# Patient Record
Sex: FEMALE | Race: OTHER | NOT HISPANIC OR LATINO
[De-identification: names, ages, dates, MRNs, and addresses within clinical notes are randomized per-mention and may not be internally consistent; named-entity substitution may affect disease eponyms.]

---

## 2019-03-15 ENCOUNTER — ASOB RESULT (OUTPATIENT)
Age: 34
End: 2019-03-15

## 2019-03-15 ENCOUNTER — APPOINTMENT (OUTPATIENT)
Dept: ANTEPARTUM | Facility: CLINIC | Age: 34
End: 2019-03-15
Payer: MEDICARE

## 2019-03-15 PROBLEM — Z00.00 ENCOUNTER FOR PREVENTIVE HEALTH EXAMINATION: Status: ACTIVE | Noted: 2019-03-15

## 2019-03-15 PROCEDURE — 76801 OB US < 14 WKS SINGLE FETUS: CPT

## 2019-03-15 PROCEDURE — 76813 OB US NUCHAL MEAS 1 GEST: CPT

## 2019-03-15 PROCEDURE — 36416 COLLJ CAPILLARY BLOOD SPEC: CPT

## 2019-05-04 ENCOUNTER — APPOINTMENT (OUTPATIENT)
Dept: ANTEPARTUM | Facility: CLINIC | Age: 34
End: 2019-05-04
Payer: MEDICARE

## 2019-05-04 ENCOUNTER — ASOB RESULT (OUTPATIENT)
Age: 34
End: 2019-05-04

## 2019-05-04 PROCEDURE — 76805 OB US >/= 14 WKS SNGL FETUS: CPT

## 2019-09-22 ENCOUNTER — INPATIENT (INPATIENT)
Facility: HOSPITAL | Age: 34
LOS: 2 days | Discharge: HOME | End: 2019-09-25
Attending: OBSTETRICS & GYNECOLOGY | Admitting: OBSTETRICS & GYNECOLOGY

## 2019-09-22 VITALS
SYSTOLIC BLOOD PRESSURE: 136 MMHG | TEMPERATURE: 99 F | DIASTOLIC BLOOD PRESSURE: 81 MMHG | HEART RATE: 79 BPM | RESPIRATION RATE: 18 BRPM

## 2019-09-22 DIAGNOSIS — O26.899 OTHER SPECIFIED PREGNANCY RELATED CONDITIONS, UNSPECIFIED TRIMESTER: ICD-10-CM

## 2019-09-22 DIAGNOSIS — Z3A.00 WEEKS OF GESTATION OF PREGNANCY NOT SPECIFIED: ICD-10-CM

## 2019-09-23 ENCOUNTER — TRANSCRIPTION ENCOUNTER (OUTPATIENT)
Age: 34
End: 2019-09-23

## 2019-09-23 LAB
AMNISURE ROM (RUPTURE OF MEMBRANES): NEGATIVE — SIGNIFICANT CHANGE UP
BASOPHILS # BLD AUTO: 0.03 K/UL — SIGNIFICANT CHANGE UP (ref 0–0.2)
BASOPHILS NFR BLD AUTO: 0.3 % — SIGNIFICANT CHANGE UP (ref 0–2)
BLD GP AB SCN SERPL QL: NEGATIVE — SIGNIFICANT CHANGE UP
EOSINOPHIL # BLD AUTO: 0.17 K/UL — SIGNIFICANT CHANGE UP (ref 0–0.5)
EOSINOPHIL NFR BLD AUTO: 1.9 % — SIGNIFICANT CHANGE UP (ref 0–6)
HCT VFR BLD CALC: 36.6 % — SIGNIFICANT CHANGE UP (ref 34.5–45)
HGB BLD-MCNC: 11.6 G/DL — SIGNIFICANT CHANGE UP (ref 11.5–15.5)
IMM GRANULOCYTES NFR BLD AUTO: 0.9 % — SIGNIFICANT CHANGE UP (ref 0–1.5)
LYMPHOCYTES # BLD AUTO: 2.73 K/UL — SIGNIFICANT CHANGE UP (ref 1–3.3)
LYMPHOCYTES # BLD AUTO: 30.8 % — SIGNIFICANT CHANGE UP (ref 13–44)
MCHC RBC-ENTMCNC: 30.9 PG — SIGNIFICANT CHANGE UP (ref 27–34)
MCHC RBC-ENTMCNC: 31.7 % — LOW (ref 32–36)
MCV RBC AUTO: 97.6 FL — SIGNIFICANT CHANGE UP (ref 80–100)
MONOCYTES # BLD AUTO: 0.67 K/UL — SIGNIFICANT CHANGE UP (ref 0–0.9)
MONOCYTES NFR BLD AUTO: 7.6 % — SIGNIFICANT CHANGE UP (ref 2–14)
NEUTROPHILS # BLD AUTO: 5.19 K/UL — SIGNIFICANT CHANGE UP (ref 1.8–7.4)
NEUTROPHILS NFR BLD AUTO: 58.5 % — SIGNIFICANT CHANGE UP (ref 43–77)
NRBC # FLD: 0 K/UL — SIGNIFICANT CHANGE UP (ref 0–0)
PLATELET # BLD AUTO: 216 K/UL — SIGNIFICANT CHANGE UP (ref 150–400)
PMV BLD: 11 FL — SIGNIFICANT CHANGE UP (ref 7–13)
RBC # BLD: 3.75 M/UL — LOW (ref 3.8–5.2)
RBC # FLD: 13.2 % — SIGNIFICANT CHANGE UP (ref 10.3–14.5)
RH IG SCN BLD-IMP: POSITIVE — SIGNIFICANT CHANGE UP
RH IG SCN BLD-IMP: POSITIVE — SIGNIFICANT CHANGE UP
T PALLIDUM AB TITR SER: NEGATIVE — SIGNIFICANT CHANGE UP
WBC # BLD: 8.87 K/UL — SIGNIFICANT CHANGE UP (ref 3.8–10.5)
WBC # FLD AUTO: 8.87 K/UL — SIGNIFICANT CHANGE UP (ref 3.8–10.5)

## 2019-09-23 RX ORDER — BENZOCAINE 10 %
1 GEL (GRAM) MUCOUS MEMBRANE EVERY 6 HOURS
Refills: 0 | Status: DISCONTINUED | OUTPATIENT
Start: 2019-09-23 | End: 2019-09-25

## 2019-09-23 RX ORDER — DIPHENHYDRAMINE HCL 50 MG
25 CAPSULE ORAL EVERY 6 HOURS
Refills: 0 | Status: DISCONTINUED | OUTPATIENT
Start: 2019-09-23 | End: 2019-09-25

## 2019-09-23 RX ORDER — IBUPROFEN 200 MG
600 TABLET ORAL EVERY 6 HOURS
Refills: 0 | Status: COMPLETED | OUTPATIENT
Start: 2019-09-23 | End: 2020-08-21

## 2019-09-23 RX ORDER — LANOLIN
1 OINTMENT (GRAM) TOPICAL EVERY 6 HOURS
Refills: 0 | Status: DISCONTINUED | OUTPATIENT
Start: 2019-09-23 | End: 2019-09-25

## 2019-09-23 RX ORDER — SIMETHICONE 80 MG/1
80 TABLET, CHEWABLE ORAL EVERY 4 HOURS
Refills: 0 | Status: DISCONTINUED | OUTPATIENT
Start: 2019-09-23 | End: 2019-09-25

## 2019-09-23 RX ORDER — ACETAMINOPHEN 500 MG
975 TABLET ORAL
Refills: 0 | Status: DISCONTINUED | OUTPATIENT
Start: 2019-09-24 | End: 2019-09-25

## 2019-09-23 RX ORDER — MAGNESIUM HYDROXIDE 400 MG/1
30 TABLET, CHEWABLE ORAL
Refills: 0 | Status: DISCONTINUED | OUTPATIENT
Start: 2019-09-23 | End: 2019-09-25

## 2019-09-23 RX ORDER — TETANUS TOXOID, REDUCED DIPHTHERIA TOXOID AND ACELLULAR PERTUSSIS VACCINE, ADSORBED 5; 2.5; 8; 8; 2.5 [IU]/.5ML; [IU]/.5ML; UG/.5ML; UG/.5ML; UG/.5ML
0.5 SUSPENSION INTRAMUSCULAR ONCE
Refills: 0 | Status: COMPLETED | OUTPATIENT
Start: 2019-09-23

## 2019-09-23 RX ORDER — AER TRAVELER 0.5 G/1
1 SOLUTION RECTAL; TOPICAL EVERY 4 HOURS
Refills: 0 | Status: DISCONTINUED | OUTPATIENT
Start: 2019-09-23 | End: 2019-09-25

## 2019-09-23 RX ORDER — GLYCERIN ADULT
1 SUPPOSITORY, RECTAL RECTAL AT BEDTIME
Refills: 0 | Status: DISCONTINUED | OUTPATIENT
Start: 2019-09-23 | End: 2019-09-25

## 2019-09-23 RX ORDER — PRAMOXINE HYDROCHLORIDE 150 MG/15G
1 AEROSOL, FOAM RECTAL EVERY 4 HOURS
Refills: 0 | Status: DISCONTINUED | OUTPATIENT
Start: 2019-09-23 | End: 2019-09-25

## 2019-09-23 RX ORDER — DIBUCAINE 1 %
1 OINTMENT (GRAM) RECTAL EVERY 6 HOURS
Refills: 0 | Status: DISCONTINUED | OUTPATIENT
Start: 2019-09-23 | End: 2019-09-25

## 2019-09-23 RX ORDER — OXYTOCIN 10 UNIT/ML
2 VIAL (ML) INJECTION
Qty: 30 | Refills: 0 | Status: DISCONTINUED | OUTPATIENT
Start: 2019-09-23 | End: 2019-09-23

## 2019-09-23 RX ORDER — SODIUM CHLORIDE 9 MG/ML
3 INJECTION INTRAMUSCULAR; INTRAVENOUS; SUBCUTANEOUS EVERY 8 HOURS
Refills: 0 | Status: DISCONTINUED | OUTPATIENT
Start: 2019-09-23 | End: 2019-09-25

## 2019-09-23 RX ORDER — OXYCODONE HYDROCHLORIDE 5 MG/1
5 TABLET ORAL
Refills: 0 | Status: DISCONTINUED | OUTPATIENT
Start: 2019-09-23 | End: 2019-09-25

## 2019-09-23 RX ORDER — CITRIC ACID/SODIUM CITRATE 300-500 MG
15 SOLUTION, ORAL ORAL EVERY 6 HOURS
Refills: 0 | Status: DISCONTINUED | OUTPATIENT
Start: 2019-09-23 | End: 2019-09-23

## 2019-09-23 RX ORDER — OXYCODONE HYDROCHLORIDE 5 MG/1
5 TABLET ORAL ONCE
Refills: 0 | Status: DISCONTINUED | OUTPATIENT
Start: 2019-09-23 | End: 2019-09-25

## 2019-09-23 RX ORDER — SODIUM CHLORIDE 9 MG/ML
1000 INJECTION, SOLUTION INTRAVENOUS
Refills: 0 | Status: DISCONTINUED | OUTPATIENT
Start: 2019-09-23 | End: 2019-09-23

## 2019-09-23 RX ORDER — HYDROCORTISONE 1 %
1 OINTMENT (GRAM) TOPICAL EVERY 6 HOURS
Refills: 0 | Status: DISCONTINUED | OUTPATIENT
Start: 2019-09-23 | End: 2019-09-25

## 2019-09-23 RX ORDER — KETOROLAC TROMETHAMINE 30 MG/ML
30 SYRINGE (ML) INJECTION ONCE
Refills: 0 | Status: DISCONTINUED | OUTPATIENT
Start: 2019-09-23 | End: 2019-09-23

## 2019-09-23 RX ORDER — OXYTOCIN 10 UNIT/ML
333.33 VIAL (ML) INJECTION
Qty: 20 | Refills: 0 | Status: COMPLETED | OUTPATIENT
Start: 2019-09-23 | End: 2019-09-23

## 2019-09-23 RX ORDER — DOCUSATE SODIUM 100 MG
100 CAPSULE ORAL
Refills: 0 | Status: DISCONTINUED | OUTPATIENT
Start: 2019-09-23 | End: 2019-09-25

## 2019-09-23 RX ADMIN — Medication 1000 MILLIUNIT(S)/MIN: at 17:14

## 2019-09-23 RX ADMIN — Medication 30 MILLIGRAM(S): at 18:01

## 2019-09-23 RX ADMIN — SODIUM CHLORIDE 125 MILLILITER(S): 9 INJECTION, SOLUTION INTRAVENOUS at 03:52

## 2019-09-23 RX ADMIN — SODIUM CHLORIDE 3 MILLILITER(S): 9 INJECTION INTRAMUSCULAR; INTRAVENOUS; SUBCUTANEOUS at 22:18

## 2019-09-23 RX ADMIN — Medication 2 MILLIUNIT(S)/MIN: at 06:47

## 2019-09-23 RX ADMIN — Medication 30 MILLIGRAM(S): at 17:28

## 2019-09-23 NOTE — OB PROVIDER IHI INDUCTION/AUGMENTATION NOTE - NS_CHECKALL_OBGYN_ALL_OB
FHR was reviewed/H&P was completed/Order was written/Induction / Augmentation was discussed/Contractions pattern was reviewed

## 2019-09-23 NOTE — OB PROVIDER H&P - ASSESSMENT
Pt of Dr. Bruce: 34yo  at 40.4wks, presents to triage with c/o contractions and some leakage of fluid around 10am. Denies any vaginal bleeding; reports good fetal movement.    Allergies: denies  Meds: PNV  PMH: denies  PSH: denies  OBYN  -3/13/2006, , FT, 8#  -Current AP comp: denies    Assessment/ Plan    VS: /73, HR 75, T 37.2  Abd soft, nontender, gravid  SSE: mucoursy discharge, negative nitrazine, negative ferning  SVE: 3/70/-3  TAS: anterior placenta, vertex, BPP=8/8; BERONICA=10.82  NST: , moderate variability, accels, mild variable noted  Ctx q 3-5min on toco    -Amnisure sent    250am  -AMNISURE= NEGATIVE  -NST: , minimal to moderate variability, accels, variable and late decels noted  Ctx q 4-7min on toco    -Tracing reviewed with Dr. Ferrell; Cat II  -All findings d/w Dr. Bruce; plan is for admission secondary to Cat II  -Routine labs  -GBS neg status  -Pain management as per pt request

## 2019-09-23 NOTE — DISCHARGE NOTE OB - CARE PROVIDER_API CALL
Alicia Vázquez)  Gynecology Obstetrics  Gynecology  [address] [place] [address]  Phone: [phone]  Fax: [phone]  Follow Up Time:

## 2019-09-23 NOTE — OB NEONATOLOGY/PEDIATRICIAN DELIVERY SUMMARY - NSPEDSNEONOTESA_OBGYN_ALL_OB_FT
Baby is a 40+3 wk GA female born to a 34 y/o  mother via . Maternal history significant for anxiety and depression, medications were stopped 3 years ago. Prenatal history uncomplicated. Maternal blood type O+. Prenatal labs negative, non-reactive, and immune. GBS negative on . AROM at 1115, meconium stained fluids. Baby born vigorous and crying spontaneously. Warmed, dried, stimulated. Apgars 9/9. EOS 0.17. Mom plans to breastfeed, and would like hepB.

## 2019-09-23 NOTE — OB PROVIDER DELIVERY SUMMARY - NSPROVIDERDELIVERYNOTE_OBGYN_ALL_OB_FT
precipitous delivery of liveborn female infant from AMBREEN position. Head, shoulders, and body delivered without complication. Infant was suctioned. light meconium. Delayed cord clamping and infant was passed to mother. Cord clamped and cut. Placenta delivered intact with a 3 vessel cord. Fundal massage was given and uterine fundus was found to be slightly atonic, Cytotec 1000mcg was given rectally and uterus was subsequently firm.  Vaginal exam revealed an intact cervix; midline sulcus tear and right vaginal wall were repaired with 2-0 chromic without complication.  Excellent hemostasis was noted. Patient was stable and went to recovery. Count was correct x 2.

## 2019-09-23 NOTE — CHART NOTE - NSCHARTNOTEFT_GEN_A_CORE
r4ob    early decels noted. 9.5/100/-1 intact. dr rachael rangel notified, will come for delivery    Cyndee Lee PGY4

## 2019-09-23 NOTE — OB PROVIDER TRIAGE NOTE - NSHPPHYSICALEXAM_GEN_ALL_CORE
VS: /73, HR 75, T 37.2  Abd soft, nontender, gravid  SSE: mucoursy discharge, negative nitrazine, negative ferning  SVE: 3/70/-3  TAS: anterior placenta, vertex, BPP=8/8; BERONICA=10.82  NST: , moderate variability, accels, mild variable noted  Ctx q 3-5min on toco

## 2019-09-23 NOTE — OB PROVIDER H&P - HISTORY OF PRESENT ILLNESS
Pt of Dr. Bruce: 34yo  at 40.4wks, presents to triage with c/o contractions and some leakage of fluid around 10am. Denies any vaginal bleeding; reports good fetal movement.    Allergies: denies  Meds: PNV  PMH: denies  PSH: denies  OBYN  -3/13/2006, , FT, 8#  -Current AP comp: denies

## 2019-09-23 NOTE — OB RN DELIVERY SUMMARY - NS_SEPSISRSKCALC_OBGYN_ALL_OB_FT
GBS status in the 'Prenatal Lab tests/results section' on the OB RN Patient Profile must be documented. EOS calculated successfully. EOS Risk Factor: 0.5/1000 live births ([facility] national incidence); GA=40w4d; Temp=99.68; ROM=4.417; GBS='Negative'; Antibiotics='No antibiotics or any antibiotics < 2 hrs prior to birth'

## 2019-09-23 NOTE — OB PROVIDER TRIAGE NOTE - HISTORY OF PRESENT ILLNESS
Pt of Dr. Bruce: 32yo  at 40.4wks, presents to triage with c/o contractions and some leakage of fluid around 10am. Denies any vaginal bleeding; reports good fetal movement.    Allergies: denies  Meds: PNV  PMH: denies  PSH: denies  OBYN  -3/13/2006, , FT, 8#  -Current AP comp: denies

## 2019-09-23 NOTE — OB PROVIDER TRIAGE NOTE - NSOBPROVIDERNOTE_OBGYN_ALL_OB_FT
Pt of Dr. Bruce: 32yo  at 40.4wks, presents to triage with c/o contractions and some leakage of fluid around 10am. Denies any vaginal bleeding; reports good fetal movement.    Allergies: denies  Meds: PNV  PMH: denies  PSH: denies  OBYN  -3/13/2006, , FT, 8#  -Current AP comp: denies    Assessment/ Plan    VS: /73, HR 75, T 37.2  Abd soft, nontender, gravid  SSE: mucoursy discharge, negative nitrazine, negative ferning  SVE: 3/70/-3  TAS: anterior placenta, vertex, BPP=8/8; BERONICA=10.82  NST: , moderate variability, accels, mild variable noted  Ctx q 3-5min on toco    -Amnisure sent Pt of Dr. Bruce: 34yo  at 40.4wks, presents to triage with c/o contractions and some leakage of fluid around 10am. Denies any vaginal bleeding; reports good fetal movement.    Allergies: denies  Meds: PNV  PMH: denies  PSH: denies  OBYN  -3/13/2006, , FT, 8#  -Current AP comp: denies    Assessment/ Plan    VS: /73, HR 75, T 37.2  Abd soft, nontender, gravid  SSE: mucoursy discharge, negative nitrazine, negative ferning  SVE: 3/70/-3  TAS: anterior placenta, vertex, BPP=8/8; BERONICA=10.82  NST: , moderate variability, accels, mild variable noted  Ctx q 3-5min on toco    -Amnisure sent    250am  -AMNISURE= NEGATIVE  -NST: , minimal to moderate variability, accels, variable and late decels noted  Ctx q 4-7min on toco    -Tracing reviewed with Dr. Ferrell; Cat II  -All findings d/w Dr. Bruce; plan is for admission secondary to Cat II  -Routine labs  -Repeat SVE in 2 hrs  -GBS neg status  -Pain management as per pt request

## 2019-09-23 NOTE — DISCHARGE NOTE OB - PATIENT PORTAL LINK FT
You can access the FollowMyHealth Patient Portal offered by [facility] by registering at the following website: [email or web address]. By joining [facility]’s FollowMyHealth portal, you will also be able to view your health information using other applications (apps) compatible with our system.

## 2019-09-23 NOTE — CHART NOTE - NSCHARTNOTEFT_GEN_A_CORE
Patient seen and examined at bedside. VE: 10/100/+1, reports feeling increased pressure and urge to push. Tracing reviewed - moderate variability, early decels. Coached on pushing technique, will continue pushing with nursing.

## 2019-09-23 NOTE — DISCHARGE NOTE OB - MEDICATION SUMMARY - MEDICATIONS TO TAKE
I will START or STAY ON the medications listed below when I get home from the hospital:  None I will START or STAY ON the medications listed below when I get home from the hospital:    ibuprofen 600 mg oral tablet  -- 1 tab(s) by mouth every 6 hours, As Needed  -- Indication: For pain

## 2019-09-24 LAB
HCT VFR BLD CALC: 29.5 % — LOW (ref 34.5–45)
HGB BLD-MCNC: 9.7 G/DL — LOW (ref 11.5–15.5)

## 2019-09-24 RX ORDER — FOLIC ACID 0.8 MG
0 TABLET ORAL
Qty: 0 | Refills: 0 | DISCHARGE

## 2019-09-24 RX ORDER — INFLUENZA VIRUS VACCINE 15; 15; 15; 15 UG/.5ML; UG/.5ML; UG/.5ML; UG/.5ML
0.5 SUSPENSION INTRAMUSCULAR ONCE
Refills: 0 | Status: COMPLETED | OUTPATIENT
Start: 2019-09-24 | End: 2019-09-24

## 2019-09-24 RX ORDER — IBUPROFEN 200 MG
600 TABLET ORAL EVERY 6 HOURS
Refills: 0 | Status: DISCONTINUED | OUTPATIENT
Start: 2019-09-24 | End: 2019-09-25

## 2019-09-24 RX ADMIN — Medication 600 MILLIGRAM(S): at 17:22

## 2019-09-24 RX ADMIN — Medication 1 TABLET(S): at 11:39

## 2019-09-24 RX ADMIN — Medication 600 MILLIGRAM(S): at 12:30

## 2019-09-24 RX ADMIN — Medication 975 MILLIGRAM(S): at 07:19

## 2019-09-24 RX ADMIN — Medication 975 MILLIGRAM(S): at 21:43

## 2019-09-24 RX ADMIN — Medication 600 MILLIGRAM(S): at 11:39

## 2019-09-24 RX ADMIN — Medication 975 MILLIGRAM(S): at 06:42

## 2019-09-24 RX ADMIN — Medication 600 MILLIGRAM(S): at 18:22

## 2019-09-24 RX ADMIN — SODIUM CHLORIDE 3 MILLILITER(S): 9 INJECTION INTRAMUSCULAR; INTRAVENOUS; SUBCUTANEOUS at 05:55

## 2019-09-24 RX ADMIN — Medication 975 MILLIGRAM(S): at 22:43

## 2019-09-24 RX ADMIN — INFLUENZA VIRUS VACCINE 0.5 MILLILITER(S): 15; 15; 15; 15 SUSPENSION INTRAMUSCULAR at 17:20

## 2019-09-24 NOTE — PROGRESS NOTE ADULT - SUBJECTIVE AND OBJECTIVE BOX
Post-partum Note,   She is a  33y woman who is now post-partum day: 1    Subjective:  The patient feels well.  She is ambulating.   She is tolerating regular diet.  She denies nausea and vomiting; denies fever.  She is voiding.  Her pain is controlled.  She reports normal postpartum bleeding.  She is breastfeeding.  She is formula feeding.    Physical exam:    Vital Signs Last 24 Hrs  T(C): 36.7 (24 Sep 2019 06:09), Max: 37.6 (23 Sep 2019 13:29)  T(F): 98 (24 Sep 2019 06:09), Max: 99.68 (23 Sep 2019 13:29)  HR: 82 (24 Sep 2019 01:37) (63 - 125)  BP: 100/53 (24 Sep 2019 01:37) (97/56 - 161/108)  BP(mean): --  RR: 18 (24 Sep 2019 06:09) (17 - 18)  SpO2: 99% (24 Sep 2019 06:09) (87% - 100%)    Gen: NAD  Breast: Soft, nontender, not engorged.  Abdomen: Soft, nontender, no distension , firm uterine fundus at umbilicus.  Pelvic: Normal lochia noted  Ext: No calf tenderness    LABS:                        11.6   8.87  )-----------( 216      ( 23 Sep 2019 04:00 )             36.6       Rubella status:     Allergies    No Known Allergies    Intolerances      MEDICATIONS  (STANDING):  acetaminophen   Tablet .. 975 milliGRAM(s) Oral <User Schedule>  diphtheria/tetanus/pertussis (acellular) Vaccine (ADAcel) 0.5 milliLiter(s) IntraMuscular once  ibuprofen  Tablet. 600 milliGRAM(s) Oral every 6 hours  prenatal multivitamin 1 Tablet(s) Oral daily  sodium chloride 0.9% lock flush 3 milliLiter(s) IV Push every 8 hours    MEDICATIONS  (PRN):  benzocaine 20%/menthol 0.5% Spray 1 Spray(s) Topical every 6 hours PRN for Perineal discomfort  dibucaine 1% Ointment 1 Application(s) Topical every 6 hours PRN Perineal discomfort  diphenhydrAMINE 25 milliGRAM(s) Oral every 6 hours PRN Pruritus  docusate sodium 100 milliGRAM(s) Oral two times a day PRN For stool softening  glycerin Suppository - Adult 1 Suppository(s) Rectal at bedtime PRN Constipation  hydrocortisone 1% Cream 1 Application(s) Topical every 6 hours PRN Moderate Pain (4-6)  lanolin Ointment 1 Application(s) Topical every 6 hours PRN nipple soreness  magnesium hydroxide Suspension 30 milliLiter(s) Oral two times a day PRN Constipation  oxyCODONE    IR 5 milliGRAM(s) Oral every 3 hours PRN Moderate to Severe Pain (4-10)  oxyCODONE    IR 5 milliGRAM(s) Oral once PRN Moderate to Severe Pain (4-10)  pramoxine 1%/zinc 5% Cream 1 Application(s) Topical every 4 hours PRN Moderate Pain (4-6)  simethicone 80 milliGRAM(s) Chew every 4 hours PRN Gas  witch hazel Pads 1 Application(s) Topical every 4 hours PRN Perineal discomfort        Assessment and Plan  PPD #1 s/p   Doing well.  Encourage ambulation.  PP & PPD Instructions reviewed.  CPC.  D/C home tomorrow.

## 2019-09-25 VITALS
RESPIRATION RATE: 18 BRPM | OXYGEN SATURATION: 100 % | TEMPERATURE: 98 F | DIASTOLIC BLOOD PRESSURE: 53 MMHG | HEART RATE: 74 BPM | SYSTOLIC BLOOD PRESSURE: 100 MMHG

## 2019-09-25 RX ORDER — TETANUS TOXOID, REDUCED DIPHTHERIA TOXOID AND ACELLULAR PERTUSSIS VACCINE, ADSORBED 5; 2.5; 8; 8; 2.5 [IU]/.5ML; [IU]/.5ML; UG/.5ML; UG/.5ML; UG/.5ML
0.5 SUSPENSION INTRAMUSCULAR ONCE
Refills: 0 | Status: COMPLETED | OUTPATIENT
Start: 2019-09-25 | End: 2019-09-25

## 2019-09-25 RX ORDER — IBUPROFEN 200 MG
1 TABLET ORAL
Qty: 0 | Refills: 0 | DISCHARGE
Start: 2019-09-25

## 2019-09-25 RX ADMIN — Medication 600 MILLIGRAM(S): at 08:50

## 2019-09-25 RX ADMIN — Medication 600 MILLIGRAM(S): at 07:53

## 2019-09-25 RX ADMIN — TETANUS TOXOID, REDUCED DIPHTHERIA TOXOID AND ACELLULAR PERTUSSIS VACCINE, ADSORBED 0.5 MILLILITER(S): 5; 2.5; 8; 8; 2.5 SUSPENSION INTRAMUSCULAR at 07:53

## 2021-08-24 NOTE — OB PROVIDER H&P - NSHPROSALLOTHERNEGRD_GEN_ALL_CORE
Use medication as directed.  Return if your symptoms worsen, if having having persistent vomiting spite the antinausea medication, fever, increasing abdominal pain, suspicion of dehydration, or any other concerns for your health in general.  Follow-up with a primary physician within the next several days.  You may follow-up with the family practice clinic if needed.   All other review of systems negative, except as noted in HPI

## 2022-04-04 ENCOUNTER — TRANSCRIPTION ENCOUNTER (OUTPATIENT)
Age: 37
End: 2022-04-04

## 2023-10-30 NOTE — OB PROVIDER H&P - NS_MATERNALTRANS_OBGYN_ALL_OB
EKG 01:32 My Reading:  Rate: 58  Rhythm: Sinus bradycardia with first-degree AV block  ST Segments: Deeply inverted inferior and lateral T waves  STEMI: no  QTc: 449 (normal)  QRS: 146 (IVCD present)  Comparison to prior: Not substantially changed compared to earlier this morning       Roman Oakley MD  10/30/23 [id number] No

## 2025-07-01 NOTE — OB RN PATIENT PROFILE - NS PRO ABUSE SCREEN AFRAID ANYONE YN
Sw spoke with pt about transferring care to [facility] from [facility].  Pt hoping for virtual appointments - reports feeling uncomfortable in the building.  Pt initially came here for care a year ago but decided to receive care at [facility], since she was already a patient there for other things.   Sw notes that providers offer some virtual appts, but will still have to see in person occasionally.  Pt already on medications - needs new provider.  Pt also interested in finding new counselor - preferably male.  Pt gives sw permission to provide name to SINGH Hodges, to set up initial appt.  Pt has no other questions for sw at this time.    Time: 15  EIS   unable to assess